# Patient Record
Sex: FEMALE | Race: WHITE | NOT HISPANIC OR LATINO | Employment: UNEMPLOYED | URBAN - NONMETROPOLITAN AREA
[De-identification: names, ages, dates, MRNs, and addresses within clinical notes are randomized per-mention and may not be internally consistent; named-entity substitution may affect disease eponyms.]

---

## 2021-06-28 ENCOUNTER — OFFICE VISIT (OUTPATIENT)
Dept: FAMILY MEDICINE CLINIC | Facility: CLINIC | Age: 26
End: 2021-06-28

## 2021-06-28 VITALS
SYSTOLIC BLOOD PRESSURE: 120 MMHG | HEIGHT: 64 IN | WEIGHT: 138 LBS | HEART RATE: 82 BPM | TEMPERATURE: 98.4 F | DIASTOLIC BLOOD PRESSURE: 70 MMHG | BODY MASS INDEX: 23.56 KG/M2

## 2021-06-28 DIAGNOSIS — Z83.3 FAMILY HISTORY OF DIABETES MELLITUS: ICD-10-CM

## 2021-06-28 DIAGNOSIS — N93.8 DYSFUNCTIONAL UTERINE BLEEDING: ICD-10-CM

## 2021-06-28 DIAGNOSIS — Z01.419 PAP SMEAR, AS PART OF ROUTINE GYNECOLOGICAL EXAMINATION: ICD-10-CM

## 2021-06-28 DIAGNOSIS — Z80.3 FAMILY HISTORY OF BREAST CANCER IN MOTHER: ICD-10-CM

## 2021-06-28 DIAGNOSIS — Z00.00 ROUTINE ADULT HEALTH MAINTENANCE: Primary | ICD-10-CM

## 2021-06-28 DIAGNOSIS — N64.59 ABNORMAL BREAST TISSUE: ICD-10-CM

## 2021-06-28 LAB
B-HCG UR QL: NEGATIVE
BILIRUB BLD-MCNC: NEGATIVE MG/DL
CLARITY, POC: ABNORMAL
COLOR UR: YELLOW
GLUCOSE UR STRIP-MCNC: NEGATIVE MG/DL
INTERNAL NEGATIVE CONTROL: NORMAL
INTERNAL POSITIVE CONTROL: NORMAL
KETONES UR QL: NEGATIVE
LEUKOCYTE EST, POC: NEGATIVE
Lab: NORMAL
NITRITE UR-MCNC: NEGATIVE MG/ML
PH UR: 6 [PH] (ref 5–8)
PROT UR STRIP-MCNC: NEGATIVE MG/DL
RBC # UR STRIP: NEGATIVE /UL
SP GR UR: 1.01 (ref 1–1.03)
UROBILINOGEN UR QL: NORMAL

## 2021-06-28 PROCEDURE — 81003 URINALYSIS AUTO W/O SCOPE: CPT | Performed by: PHYSICIAN ASSISTANT

## 2021-06-28 PROCEDURE — 81025 URINE PREGNANCY TEST: CPT | Performed by: PHYSICIAN ASSISTANT

## 2021-06-28 PROCEDURE — 99385 PREV VISIT NEW AGE 18-39: CPT | Performed by: PHYSICIAN ASSISTANT

## 2021-06-28 NOTE — PROGRESS NOTES
Subjective   Rosibel Delgado is a 26 y.o. female who presents today as a new patient to establish care and for CPE and PAP.     History of Present Illness     Previous PCP pediatrician. Last appt [employer].     No ongoing medical treatment.     She would like labs and PAP.   Menses last about 1 week- heavier for a few days then lighter. Now is about a week then no bleeding and has some spotting after period.   Not SA x 1 year. No chance of pregnancy. 3 lifetime partners. No contraception with intercourse. No interest in contraception now. No other problems. No increased cramping or pain with menses. When spotting, she has a tiny bit of cramping.     Not sure the age of diagnosis for cancer.     No past medical history on file.  Past Surgical History:   Procedure Laterality Date   • TONSILLECTOMY       Social History     Socioeconomic History   • Marital status: Single     Spouse name: Not on file   • Number of children: Not on file   • Years of education: Not on file   • Highest education level: Not on file   Tobacco Use   • Smoking status: Never Smoker   • Smokeless tobacco: Never Used   Vaping Use   • Vaping Use: Never used   Substance and Sexual Activity   • Alcohol use: Never   • Drug use: Never   • Sexual activity: Defer      Family History   Problem Relation Age of Onset   • Cancer Mother         breast    • Cancer Maternal Grandmother         breast         The following portions of the patient's history were reviewed and updated as appropriate: allergies, current medications, past family history, past medical history, past social history, past surgical history and problem list.    Review of Systems   Constitutional: Negative.    HENT: Negative.    Respiratory: Negative.    Cardiovascular: Negative.    Gastrointestinal: Negative.    Genitourinary: Positive for menstrual problem.   Neurological: Negative.    Psychiatric/Behavioral: The patient is nervous/anxious.        Objective   Vitals:    06/28/21  0832   BP: 120/70   Pulse: 82   Temp: 98.4 °F (36.9 °C)     Body mass index is 23.69 kg/m².    Physical Exam  Vitals and nursing note reviewed.   Constitutional:       General: She is not in acute distress.     Appearance: She is well-developed. She is not diaphoretic.   HENT:      Head: Normocephalic and atraumatic.      Right Ear: Hearing, tympanic membrane, ear canal and external ear normal.      Left Ear: Hearing, tympanic membrane, ear canal and external ear normal.      Nose: Nose normal.   Eyes:      General: Lids are normal.      Conjunctiva/sclera: Conjunctivae normal.      Pupils: Pupils are equal, round, and reactive to light.   Neck:      Thyroid: No thyroid mass or thyromegaly.      Vascular: No carotid bruit or JVD.      Trachea: No tracheal deviation.   Cardiovascular:      Rate and Rhythm: Normal rate and regular rhythm.      Pulses:           Radial pulses are 2+ on the right side and 2+ on the left side.        Posterior tibial pulses are 2+ on the right side and 2+ on the left side.      Heart sounds: Normal heart sounds. No murmur heard.   No friction rub. No gallop.    Pulmonary:      Effort: Pulmonary effort is normal. No respiratory distress.      Breath sounds: Normal breath sounds. No wheezing, rhonchi or rales.   Chest:      Breasts:         Right: No inverted nipple, mass, nipple discharge, skin change or tenderness.         Left: No inverted nipple, mass, nipple discharge, skin change or tenderness.       Abdominal:      General: Bowel sounds are normal. There is no distension or abdominal bruit.      Palpations: Abdomen is soft. Abdomen is not rigid.      Tenderness: There is no abdominal tenderness. There is no guarding or rebound.      Hernia: No hernia is present.   Genitourinary:     Labia:         Right: No rash, tenderness, lesion or injury.         Left: No rash, tenderness, lesion or injury.       Vagina: Normal.      Cervix: Discharge (moderate, thick, yellow/light green mixed  translucent and opaque), friability and erythema present. No cervical motion tenderness.      Adnexa:         Right: No mass, tenderness or fullness.          Left: No mass, tenderness or fullness.           Musculoskeletal:         General: No tenderness or deformity. Normal range of motion.      Cervical back: Neck supple.   Lymphadenopathy:      Cervical: No cervical adenopathy.   Skin:     General: Skin is warm and dry.      Findings: No erythema or rash.   Neurological:      Mental Status: She is alert and oriented to person, place, and time.      Cranial Nerves: No cranial nerve deficit.      Sensory: No sensory deficit.      Motor: No abnormal muscle tone.      Coordination: Coordination normal.      Gait: Gait normal.      Deep Tendon Reflexes: Reflexes are normal and symmetric. Reflexes normal.   Psychiatric:         Speech: Speech normal.         Behavior: Behavior normal.         Thought Content: Thought content normal.         Judgment: Judgment normal.         Assessment/Plan   Diagnoses and all orders for this visit:    1. Routine adult health maintenance (Primary)  -     CBC & Differential  -     Comprehensive Metabolic Panel  -     Hemoglobin A1c  -     Lipid Panel With LDL / HDL Ratio  -     Iron and TIBC  -     Ferritin  -     Vitamin B12 & Folate  -     Vitamin D 25 Hydroxy  -     TSH  -     T4, free  -     T3, Free  -     Urinalysis With Culture If Indicated -  -     POC Pregnancy, Urine  -     Pap IG, Ct-Ng TV Rfx HPV All    2. Pap smear, as part of routine gynecological examination  -     POCT urinalysis dipstick, automated  -     Pap IG, Ct-Ng TV Rfx HPV All    3. Family history of breast cancer in mother  -     Ambulatory Referral to Genetics  -     US breast bilateral limited; Future    4. Abnormal breast tissue  -     US breast bilateral limited; Future    5. Dysfunctional uterine bleeding  -     CBC & Differential  -     Comprehensive Metabolic Panel  -     Hemoglobin A1c  -     Iron and  TIBC  -     Ferritin  -     TSH  -     T4, free  -     T3, Free  -     Urinalysis With Culture If Indicated -  -     POC Pregnancy, Urine  -     Pap IG, Ct-Ng TV Rfx HPV All  -     Ambulatory Referral to Genetics  -     US breast bilateral limited; Future    6. Family history of diabetes mellitus  -     Comprehensive Metabolic Panel  -     Hemoglobin A1c  -     Lipid Panel With LDL / HDL Ratio    Other orders  -     Cancel: IGP, Aptima HPV, Rfx 16 / 18,45; Future        Assessment and Plan  Patient is a new patient with me today. She has been healthy and is in need of CPE and has never had PAP. CPE and PAP completed today. Cervix is erythematous and friable today with significant vaginal d/c. PAP and cultures today. I will consider GYN referral vs repeat exam pending results. Patient with significant FHx breast cancer in mother and MGM. She has prominent ductal tissue which may be normal. She also drinks caffeine. I will refer for bilateral breast US and to genetics to determine screenings. She needs to find out the type of cancer and age of diagnosis as well as any other FHx.  I will also check routine labs. Call if no results in 1 week. Stability of conditions, plan, follow up, and further recommendations pending labs.    Of note, she has had some increased stressors with her brother. I have recommended counseling and help with coping. She will let me know if she is willing and needs referral.

## 2021-06-29 LAB
25(OH)D3+25(OH)D2 SERPL-MCNC: 25.3 NG/ML (ref 30–100)
ALBUMIN SERPL-MCNC: 5.1 G/DL (ref 3.5–5.2)
ALBUMIN/GLOB SERPL: 2.3 G/DL
ALP SERPL-CCNC: 61 U/L (ref 39–117)
ALT SERPL-CCNC: 11 U/L (ref 1–33)
AST SERPL-CCNC: 17 U/L (ref 1–32)
BASOPHILS # BLD AUTO: 0.04 10*3/MM3 (ref 0–0.2)
BASOPHILS NFR BLD AUTO: 0.7 % (ref 0–1.5)
BILIRUB SERPL-MCNC: 0.6 MG/DL (ref 0–1.2)
BUN SERPL-MCNC: 9 MG/DL (ref 6–20)
BUN/CREAT SERPL: 10.6 (ref 7–25)
CALCIUM SERPL-MCNC: 9.7 MG/DL (ref 8.6–10.5)
CHLORIDE SERPL-SCNC: 105 MMOL/L (ref 98–107)
CHOLEST SERPL-MCNC: 194 MG/DL (ref 0–200)
CO2 SERPL-SCNC: 25.8 MMOL/L (ref 22–29)
CREAT SERPL-MCNC: 0.85 MG/DL (ref 0.57–1)
EOSINOPHIL # BLD AUTO: 0.07 10*3/MM3 (ref 0–0.4)
EOSINOPHIL NFR BLD AUTO: 1.3 % (ref 0.3–6.2)
ERYTHROCYTE [DISTWIDTH] IN BLOOD BY AUTOMATED COUNT: 13.6 % (ref 12.3–15.4)
FERRITIN SERPL-MCNC: 16.5 NG/ML (ref 13–150)
FOLATE SERPL-MCNC: 11 NG/ML (ref 4.78–24.2)
GLOBULIN SER CALC-MCNC: 2.2 GM/DL
GLUCOSE SERPL-MCNC: 94 MG/DL (ref 65–99)
HBA1C MFR BLD: 5.3 % (ref 4.8–5.6)
HCT VFR BLD AUTO: 43.5 % (ref 34–46.6)
HDLC SERPL-MCNC: 77 MG/DL (ref 40–60)
HGB BLD-MCNC: 14.4 G/DL (ref 12–15.9)
IMM GRANULOCYTES # BLD AUTO: 0.01 10*3/MM3 (ref 0–0.05)
IMM GRANULOCYTES NFR BLD AUTO: 0.2 % (ref 0–0.5)
IRON SATN MFR SERPL: 15 % (ref 20–50)
IRON SERPL-MCNC: 73 MCG/DL (ref 37–145)
LDLC SERPL CALC-MCNC: 105 MG/DL (ref 0–100)
LDLC/HDLC SERPL: 1.35 {RATIO}
LYMPHOCYTES # BLD AUTO: 1.5 10*3/MM3 (ref 0.7–3.1)
LYMPHOCYTES NFR BLD AUTO: 27.7 % (ref 19.6–45.3)
MCH RBC QN AUTO: 28.3 PG (ref 26.6–33)
MCHC RBC AUTO-ENTMCNC: 33.1 G/DL (ref 31.5–35.7)
MCV RBC AUTO: 85.6 FL (ref 79–97)
MONOCYTES # BLD AUTO: 0.4 10*3/MM3 (ref 0.1–0.9)
MONOCYTES NFR BLD AUTO: 7.4 % (ref 5–12)
NEUTROPHILS # BLD AUTO: 3.39 10*3/MM3 (ref 1.7–7)
NEUTROPHILS NFR BLD AUTO: 62.7 % (ref 42.7–76)
NRBC BLD AUTO-RTO: 0 /100 WBC (ref 0–0.2)
PLATELET # BLD AUTO: 241 10*3/MM3 (ref 140–450)
POTASSIUM SERPL-SCNC: 4 MMOL/L (ref 3.5–5.2)
PROT SERPL-MCNC: 7.3 G/DL (ref 6–8.5)
RBC # BLD AUTO: 5.08 10*6/MM3 (ref 3.77–5.28)
SODIUM SERPL-SCNC: 141 MMOL/L (ref 136–145)
T3FREE SERPL-MCNC: 3.7 PG/ML (ref 2–4.4)
T4 FREE SERPL-MCNC: 1.36 NG/DL (ref 0.93–1.7)
TIBC SERPL-MCNC: 502 MCG/DL
TRIGL SERPL-MCNC: 67 MG/DL (ref 0–150)
TSH SERPL DL<=0.005 MIU/L-ACNC: 2.18 UIU/ML (ref 0.27–4.2)
UIBC SERPL-MCNC: 429 MCG/DL (ref 112–346)
VIT B12 SERPL-MCNC: 559 PG/ML (ref 211–946)
VLDLC SERPL CALC-MCNC: 12 MG/DL (ref 5–40)
WBC # BLD AUTO: 5.41 10*3/MM3 (ref 3.4–10.8)

## 2021-07-01 ENCOUNTER — TELEPHONE (OUTPATIENT)
Dept: FAMILY MEDICINE CLINIC | Facility: CLINIC | Age: 26
End: 2021-07-01

## 2021-07-01 DIAGNOSIS — N72 INFLAMMATION OF CERVIX: Primary | ICD-10-CM

## 2021-07-01 LAB
C TRACH RRNA CVX QL NAA+PROBE: NEGATIVE
CONV .: NORMAL
CYTOLOGIST CVX/VAG CYTO: NORMAL
CYTOLOGY CVX/VAG DOC CYTO: NORMAL
CYTOLOGY CVX/VAG DOC THIN PREP: NORMAL
DX ICD CODE: NORMAL
HIV 1 & 2 AB SER-IMP: NORMAL
N GONORRHOEA RRNA CVX QL NAA+PROBE: NEGATIVE
OTHER STN SPEC: NORMAL
STAT OF ADQ CVX/VAG CYTO-IMP: NORMAL
T VAGINALIS RRNA SPEC QL NAA+PROBE: NEGATIVE

## 2021-07-01 NOTE — TELEPHONE ENCOUNTER
Caller: Sandy Rosibel    Relationship: Self    Best call back number: [phone] (H)    Caller requesting test results: BLOOD WORK & PAP SMEAR    What test was performed: BLOOD WORK & PAP SMEAR      When was the test performed: 06/28/21    Where was the test performed:     Additional notes: PATIENT CALLED TO REQUEST A CALLBACK TO DISCUSS LAB & PAP SMEAR RESULTS. PATIENT IS ALSO WANTING A COPY MAILED TO HER ADDRESS ON FILE.        THANKS

## 2021-07-13 NOTE — TELEPHONE ENCOUNTER
Patient would like to go to a GYN. She has questions on what could be causing the inflammation - She would prefer a female in the [place] area.    Please let me know and I will work on this once the referral is in    Thank you, Natalie

## 2021-07-27 ENCOUNTER — OFFICE VISIT (OUTPATIENT)
Dept: OBSTETRICS AND GYNECOLOGY | Age: 26
End: 2021-07-27

## 2021-07-27 VITALS
BODY MASS INDEX: 23.42 KG/M2 | SYSTOLIC BLOOD PRESSURE: 118 MMHG | HEIGHT: 64 IN | WEIGHT: 137.2 LBS | DIASTOLIC BLOOD PRESSURE: 72 MMHG

## 2021-07-27 DIAGNOSIS — Z80.3 FAMILY HISTORY OF BREAST CANCER: ICD-10-CM

## 2021-07-27 DIAGNOSIS — N89.8 VAGINAL DISCHARGE: ICD-10-CM

## 2021-07-27 DIAGNOSIS — N72 CERVICAL INFLAMMATION: Primary | ICD-10-CM

## 2021-07-27 LAB
B-HCG UR QL: NEGATIVE
INTERNAL NEGATIVE CONTROL: NEGATIVE
INTERNAL POSITIVE CONTROL: POSITIVE
Lab: NORMAL

## 2021-07-27 PROCEDURE — 99203 OFFICE O/P NEW LOW 30 MIN: CPT | Performed by: OBSTETRICS & GYNECOLOGY

## 2021-07-27 PROCEDURE — 81025 URINE PREGNANCY TEST: CPT | Performed by: OBSTETRICS & GYNECOLOGY

## 2021-07-27 NOTE — PROGRESS NOTES
"Chief complaint: vaginal discharge    HPI  Rosibel Delgado is a 26 y.o. female presents for further evaluation due to cervical inflammation noted on pap and exam wit her PA.  She is a new pt to this MD and office. The patient reports she has noticed increased discharge over the last year. She reports she has not been sexually active for over 1 year. She is not currently in a relationship or planning on becoming sexually active. She notes her menses are regular and monthly. She denies odor or irritation with the discharge. She reports she has also noted some pink discharge around the time of ovulation. She denies irregular bleeding.    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       The following portions of the patient's history were reviewed and updated as appropriate: allergies, current medications, past family history, past medical history, past social history, past surgical history and problem list.    Review of Systems  Constitutional: negative for fevers  Gastrointestinal: negative for abdominal pain, diarrhea, nausea and vomiting  Genitourinary:positive for vaginal discharge    /72   Ht 162.6 cm (64\")   Wt 62.2 kg (137 lb 3.2 oz)   LMP 06/27/2021 (Approximate)   Breastfeeding No   BMI 23.55 kg/m²         Physical Exam  Constitutional:       Appearance: Normal appearance.   Genitourinary:     Comments: Normal external female genitalia. Normal vagina with a small amount of white discharge. Cervix with prominent ectropion but otherwise normal. No CMT or uterine tenderness noted.   Neurological:      General: No focal deficit present.      Mental Status: She is alert and oriented to person, place, and time.   Psychiatric:         Mood and Affect: Mood normal.         Behavior: Behavior normal.             Diagnoses and all orders for this visit:    1. Cervical inflammation (Primary)    2. Vaginal discharge    3. Family history of breast cancer      Cervix with " prominent ectropion but otherwise appears normal. Recent pap negative. Reviewed findings with pt. She is symptomatic. Recommend checking nuswab and observing if negative results. Discussed increased mucous discharge common with ectropion. Recommend f/u 3 months to re-evaluate. Pt agrees with plan.    Urine hcg done today and negative    Pt notes she has been referred by her PA for genetic testing/consult related to her family history of breast cancer. She is unsure of the age her mother was diagnosed. Her mother is alive.     35 minutes spent reviewing chart, reviewing history, examining pt and providing counseling.

## 2021-07-29 LAB
A VAGINAE DNA VAG QL NAA+PROBE: NORMAL SCORE
BVAB2 DNA VAG QL NAA+PROBE: NORMAL SCORE
C ALBICANS DNA VAG QL NAA+PROBE: NEGATIVE
C GLABRATA DNA VAG QL NAA+PROBE: NEGATIVE
C TRACH DNA VAG QL NAA+PROBE: NEGATIVE
MEGA1 DNA VAG QL NAA+PROBE: NORMAL SCORE
N GONORRHOEA DNA VAG QL NAA+PROBE: NEGATIVE
T VAGINALIS DNA VAG QL NAA+PROBE: NEGATIVE

## 2021-08-02 ENCOUNTER — TELEPHONE (OUTPATIENT)
Dept: OBSTETRICS AND GYNECOLOGY | Age: 26
End: 2021-08-02

## 2021-08-02 NOTE — TELEPHONE ENCOUNTER
----- Message from Pam José MD sent at 7/30/2021  7:22 PM EDT -----  Please call Rosibel, her nuswab is negative for BV or yeast and CZ,GC or trich

## 2021-08-13 ENCOUNTER — HOSPITAL ENCOUNTER (OUTPATIENT)
Dept: ULTRASOUND IMAGING | Facility: HOSPITAL | Age: 26
Discharge: HOME | End: 2021-08-13
Admitting: PHYSICIAN ASSISTANT

## 2021-08-13 DIAGNOSIS — N93.8 DYSFUNCTIONAL UTERINE BLEEDING: ICD-10-CM

## 2021-08-13 DIAGNOSIS — Z80.3 FAMILY HISTORY OF BREAST CANCER IN MOTHER: ICD-10-CM

## 2021-08-13 DIAGNOSIS — N64.59 ABNORMAL BREAST TISSUE: ICD-10-CM

## 2021-08-13 PROCEDURE — 76642 ULTRASOUND BREAST LIMITED: CPT

## 2022-11-11 ENCOUNTER — TELEPHONE (OUTPATIENT)
Dept: OBSTETRICS AND GYNECOLOGY | Age: 27
End: 2022-11-11

## 2022-11-11 NOTE — TELEPHONE ENCOUNTER
Caller: Rosibel Delgado    Relationship: Self    Best call back number: [phone]    Who is your current provider: SHAHANA  Who would you like your new provider to be: DR. SINDI ZELAYA    What are your reasons for transferring care: N/A

## 2025-06-27 ENCOUNTER — TRANSCRIBE ORDERS (OUTPATIENT)
Dept: ADMINISTRATIVE | Facility: HOSPITAL | Age: 30
End: 2025-06-27
Payer: COMMERCIAL

## 2025-06-27 DIAGNOSIS — N93.9 ABNORMAL UTERINE BLEEDING (AUB): Primary | ICD-10-CM

## 2025-06-27 DIAGNOSIS — N63.0 MASS OF BREAST, UNSPECIFIED LATERALITY: Primary | ICD-10-CM
